# Patient Record
Sex: FEMALE | Race: WHITE | NOT HISPANIC OR LATINO | URBAN - METROPOLITAN AREA
[De-identification: names, ages, dates, MRNs, and addresses within clinical notes are randomized per-mention and may not be internally consistent; named-entity substitution may affect disease eponyms.]

---

## 2022-12-21 ENCOUNTER — OFFICE (OUTPATIENT)
Dept: URBAN - METROPOLITAN AREA CLINIC 34 | Facility: CLINIC | Age: 57
End: 2022-12-21

## 2022-12-21 VITALS
WEIGHT: 231 LBS | HEART RATE: 76 BPM | DIASTOLIC BLOOD PRESSURE: 76 MMHG | HEIGHT: 70 IN | TEMPERATURE: 97.7 F | SYSTOLIC BLOOD PRESSURE: 116 MMHG

## 2022-12-21 DIAGNOSIS — Z88.9 ALLERGY STATUS TO UNSPECIFIED DRUGS, MEDICAMENTS AND BIOLOGI: ICD-10-CM

## 2022-12-21 DIAGNOSIS — I50.9 HEART FAILURE, UNSPECIFIED: ICD-10-CM

## 2022-12-21 DIAGNOSIS — K42.9 UMBILICAL HERNIA WITHOUT OBSTRUCTION OR GANGRENE: ICD-10-CM

## 2022-12-21 DIAGNOSIS — Z12.11 ENCOUNTER FOR SCREENING FOR MALIGNANT NEOPLASM OF COLON: ICD-10-CM

## 2022-12-21 DIAGNOSIS — I48.0 PAROXYSMAL ATRIAL FIBRILLATION: ICD-10-CM

## 2022-12-21 DIAGNOSIS — Q14.0 CONGENITAL MALFORMATION OF VITREOUS HUMOR: ICD-10-CM

## 2022-12-21 DIAGNOSIS — Z79.01 LONG TERM (CURRENT) USE OF ANTICOAGULANTS: ICD-10-CM

## 2022-12-21 DIAGNOSIS — Z80.0 FAMILY HISTORY OF MALIGNANT NEOPLASM OF DIGESTIVE ORGANS: ICD-10-CM

## 2022-12-21 PROCEDURE — 99244 OFF/OP CNSLTJ NEW/EST MOD 40: CPT | Performed by: PHYSICIAN ASSISTANT

## 2022-12-21 NOTE — SERVICEHPINOTES
Ms. Katelyn Birch is a 57 YoF with history of AF and heart failure, allergies, who presents to the office requesting initial screening colonoscopy. She has regular daily BMs most days, soft/formed stools. She reports she may have had some flecks of blood on toilet paper and rectal discomfort with straining during an episode 2-3 months ago (isolated incidence of constipation), but no recent bleeding or other symptoms. No prior colonoscopies. She has a half-brother who was diagnosed with colon cancer around age 50 and shortly after passed away.br
br
She had bronchitis and pneumonia a few years ago which led to diagnosis of cardiac disease and some heart failure with Afib. Has had 3 ablations in the last 3 years for AF (Last in May of 2022). She was previously on flecainide (now off of this). She takes carvedilol, Eliquis, and as-needed lasix. Cardiologist: Dr. Culver ([facility]). She feels her overall health status is much improved and she is working on her exercise tolerance.No history of major complications of anesthesia, however she noted pain in her arm with last incidence of anesthesia(for ablation) which she felt caused her to cough and increased her heartrate. 
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

## 2023-02-07 ENCOUNTER — ON CAMPUS - OUTPATIENT (OUTPATIENT)
Dept: URBAN - METROPOLITAN AREA HOSPITAL 65 | Facility: HOSPITAL | Age: 58
End: 2023-02-07
Payer: COMMERCIAL

## 2023-02-07 DIAGNOSIS — Z80.0 FAMILY HISTORY OF MALIGNANT NEOPLASM OF DIGESTIVE ORGANS: ICD-10-CM

## 2023-02-07 DIAGNOSIS — Z12.11 ENCOUNTER FOR SCREENING FOR MALIGNANT NEOPLASM OF COLON: ICD-10-CM

## 2023-02-07 PROCEDURE — 45378 DIAGNOSTIC COLONOSCOPY: CPT | Mod: PT | Performed by: INTERNAL MEDICINE
